# Patient Record
Sex: FEMALE | Race: OTHER | HISPANIC OR LATINO
[De-identification: names, ages, dates, MRNs, and addresses within clinical notes are randomized per-mention and may not be internally consistent; named-entity substitution may affect disease eponyms.]

---

## 2019-02-19 ENCOUNTER — APPOINTMENT (OUTPATIENT)
Dept: NEUROLOGY | Facility: CLINIC | Age: 69
End: 2019-02-19

## 2022-03-08 ENCOUNTER — APPOINTMENT (OUTPATIENT)
Dept: NEUROLOGY | Facility: CLINIC | Age: 72
End: 2022-03-08

## 2023-03-02 ENCOUNTER — APPOINTMENT (OUTPATIENT)
Dept: UROLOGY | Facility: CLINIC | Age: 73
End: 2023-03-02

## 2025-07-22 ENCOUNTER — EMERGENCY (EMERGENCY)
Facility: HOSPITAL | Age: 75
LOS: 0 days | Discharge: HOME | End: 2025-07-22
Attending: EMERGENCY MEDICINE
Payer: MEDICARE

## 2025-07-22 VITALS
HEIGHT: 61 IN | HEART RATE: 97 BPM | RESPIRATION RATE: 18 BRPM | TEMPERATURE: 98 F | WEIGHT: 242.95 LBS | SYSTOLIC BLOOD PRESSURE: 159 MMHG | OXYGEN SATURATION: 97 % | DIASTOLIC BLOOD PRESSURE: 73 MMHG

## 2025-07-22 DIAGNOSIS — E78.00 PURE HYPERCHOLESTEROLEMIA, UNSPECIFIED: ICD-10-CM
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
## 2025-07-22 DIAGNOSIS — G51.0 BELL'S PALSY: ICD-10-CM
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
## 2025-07-22 DIAGNOSIS — S02.5XXA FRACTURE OF TOOTH (TRAUMATIC), INITIAL ENCOUNTER FOR CLOSED FRACTURE: ICD-10-CM

## 2025-07-22 DIAGNOSIS — Z88.2 ALLERGY STATUS TO SULFONAMIDES: ICD-10-CM

## 2025-07-22 DIAGNOSIS — K08.409 PARTIAL LOSS OF TEETH, UNSPECIFIED CAUSE, UNSPECIFIED CLASS: ICD-10-CM

## 2025-07-22 DIAGNOSIS — E11.9 TYPE 2 DIABETES MELLITUS WITHOUT COMPLICATIONS: ICD-10-CM

## 2025-07-22 DIAGNOSIS — S00.12XA CONTUSION OF LEFT EYELID AND PERIOCULAR AREA, INITIAL ENCOUNTER: ICD-10-CM

## 2025-07-22 PROCEDURE — 70486 CT MAXILLOFACIAL W/O DYE: CPT

## 2025-07-22 PROCEDURE — D0140: CPT

## 2025-07-22 PROCEDURE — 99284 EMERGENCY DEPT VISIT MOD MDM: CPT

## 2025-07-22 PROCEDURE — 99284 EMERGENCY DEPT VISIT MOD MDM: CPT | Mod: 25

## 2025-07-22 PROCEDURE — 70450 CT HEAD/BRAIN W/O DYE: CPT

## 2025-07-22 PROCEDURE — 70486 CT MAXILLOFACIAL W/O DYE: CPT | Mod: 26

## 2025-07-22 PROCEDURE — 70450 CT HEAD/BRAIN W/O DYE: CPT | Mod: 26

## 2025-07-22 PROCEDURE — 72125 CT NECK SPINE W/O DYE: CPT

## 2025-07-22 PROCEDURE — 72125 CT NECK SPINE W/O DYE: CPT | Mod: 26

## 2025-07-22 PROCEDURE — D0230: CPT

## 2025-07-22 PROCEDURE — D0330: CPT

## 2025-07-22 RX ORDER — LANOLIN/MINERAL OIL/PETROLATUM
1 OINTMENT (GRAM) OPHTHALMIC (EYE)
Qty: 1 | Refills: 0
Start: 2025-07-22 | End: 2025-07-31

## 2025-07-22 RX ORDER — AMOXICILLIN 500 MG/1
1 CAPSULE ORAL
Qty: 14 | Refills: 0
Start: 2025-07-22 | End: 2025-07-28

## 2025-07-22 NOTE — ED PROVIDER NOTE - PATIENT PORTAL LINK FT
You can access the FollowMyHealth Patient Portal offered by [facility] by registering at the following website: [email or web address]. By joining [facility]’s FollowMyHealth portal, you will also be able to view your health information using other applications (apps) compatible with our system.

## 2025-07-22 NOTE — ED PROVIDER NOTE - PROGRESS NOTE DETAILS
KIM Heredia MD:    Pt. states 10 days ago she noticed the L sided facial droop. She also noticed changes in taste and unable to close L eye completely. CT negative, neuro exam normal except for facial droop. Findings consistent with bells palsy.

## 2025-07-22 NOTE — ED PROVIDER NOTE - NSFOLLOWUPINSTRUCTIONS_ED_ALL_ED_FT
Follow up with your dentist in 1 week    Falls    Your risk of falling increases as you grow older. That's because getting older can make it harder to walk steadily and keep your balance. Also, the effects of falls are more serious in older people. If you have fallen in the past, you are at higher risk of falling again.    Several things can increase your risk of a fall, including:  - Illness(es)  - A change in the medicines you take  - An unsafe or unfamiliar setting (for example, a room with rugs or furniture that might trip you, or an area you don't know well)    Is there anything I can do on my own?    Yes. To help keep from falling, you can:    - Make your home safer:   To avoid falling at home, get rid of things that might make you trip or slip. This might include furniture, electrical cords, clutter, and loose rugs. Keep your home well-lit so that you can easily see where you are going. Avoid storing things in high places so you don't have to reach or climb.    - Wear sturdy shoes that fit well:  Walking around in bare feet, or only socks, can also increase your risk of falling.    - Use a cane, walker, and other safety devices: If your doctor recommends that you use a cane or walker, be sure that it's the right size and you know how to use it. There are other devices that might help you avoid falling, too. These include grab bars or a sturdy seat for the shower, non-slip bath mats, and hand rails or treads for the stairs (to prevent slipping).    If you worry that you could fall, there are also alarm buttons that let you call for help if you fall and can't get up.    What should I do if I fall?  If you fall, see your doctor right away, even if you aren't hurt. Your doctor can try to figure out what caused you to fall, and how likely you are to fall again. He or she will do an exam and talk to you about your health problems, medicines, and activities. Then he or she can suggest things you can do to avoid falling again.    Many older people have a hard time recovering after a fall. Doing things to prevent falling can help you to protect your health and independence.      Lovelace Palsy    WHAT YOU NEED TO KNOW:    What is Bell palsy? Bell palsy is a sudden weakness or paralysis of one side of your face. It occurs when the nerve that controls the muscles in your face becomes swollen or irritated. Bell palsy usually lasts about 2 to 3 weeks, but it can last for up to 6 months. Bell palsy can be permanent for some people. The cause of Bell palsy is not clear.  Lovelace Palsy    What increases my risk for Bell palsy?    Age 15 to 45 years    Pregnancy or preeclampsia (high blood pressure that develops because of pregnancy)    A virus, such as a cold, the flu, herpes simplex, or varicella (chicken pox)    Obesity    High blood pressure    Stress, lack of sleep, injury, or a short illness    A condition such as lupus, Lyme disease, Sjögren syndrome, or diabetes  What are the signs and symptoms of Bell palsy? You may first have pain behind an ear or in your face. Hours or days later, you may have any of the following on the same side of your face:    Weakness or paralysis in your face    Not being able to move your eyebrow or wrinkle your forehead    Trouble closing your eye or blinking, or your eye moves up when you try to close your eyelid    Changes in the amount of tears and saliva you make, such as dry eyes or drooling    Mouth drooping and trouble smiling or chewing    Loss of taste at the front part of your tongue    Sensitive hearing  How is Bell palsy diagnosed? Your healthcare provider will examine you and ask about your medical history. Tell your provider about your symptoms and when they started. Your provider will test how well you can move the muscles in your face. Your provider will need to rule out other causes of paralysis, such as a stroke. Some stroke and Bell palsy symptoms are similar, but only Bell palsy prevents movement of forehead muscles. Bell palsy only affects your face. You may also need any of the following:    An electromyography (EMG) may be used to measure the electrical activity of your muscles. An EMG also tests the nerves that control muscles.    A CT or MRI of your brain may be done to rule out other causes of your paralysis. You may be given contrast liquid to help your brain show up better in the pictures. Tell the healthcare provider if you have ever had an allergic reaction to contrast liquid. Do not enter the MRI room with anything metal. Metal can cause serious injury. Tell the provider if you have any metal in or on your body.  How is Bell palsy treated? Bell palsy often goes away without treatment. Some treatments may help you get better faster or help prevent other problems caused by Bell palsy. You may need any of the following:    Steroids may be given to decrease swelling and irritation of the nerve in your face. Your symptoms can go away faster if you get steroids 72 hours from when your paralysis started.    Antiviral medicine may be given if your provider thinks a virus caused your Bell palsy.    Acetaminophen decreases pain and fever. It is available without a doctor's order. Ask how much to take and how often to take it. Follow directions. Read the labels of all other medicines you are using to see if they also contain acetaminophen, or ask your doctor or pharmacist. Acetaminophen can cause liver damage if not taken correctly.    NSAIDs, such as ibuprofen, help decrease swelling, pain, and fever. This medicine is available with or without a doctor's order. NSAIDs can cause stomach bleeding or kidney problems in certain people. If you take blood thinner medicine, always ask your healthcare provider if NSAIDs are safe for you. Always read the medicine label and follow directions.  What else can I do to help manage Bell palsy?    Eye care may be needed to prevent vision changes, eye damage, and infection. Use eye drops during the day and an ointment at night, as directed. You may need to wear an eye patch during the day. Wear sunglasses to protect your eye from direct sunlight. Stay away from places that have particles in the air that may harm your eye. You may also need to tape your eye shut while you sleep. Get your eye checked as directed if your symptoms last longer than 3 weeks.  Eye Patch      Eat soft foods that are easy to chew and swallow. These foods may be chopped, ground, mashed, pureed, and moist. Do not eat hard or chewy foods. These foods may fall out of your mouth where it droops. Ask your healthcare provider or dietitian about the foods you should eat.    Go to speech therapy if you have trouble eating or drinking that continues longer than 3 weeks. A speech therapist can teach you new ways to eat and drink. The therapist can show you ways to prevent or manage problems with drooling or swallowing. You may also learn to plan several small meals instead of a few large meals each day.    Use ear plugs or ear protectors around loud noises, such as a lawnmower or loud music. Foam earplugs that completely block your ear canal can help decrease your sensitive hearing. Do not listen to loud music through headphones or earphones.    Go to physical therapy as directed. A physical therapist can teach you how to massage and exercise the muscles in your face. These exercises may help prevent long-term problems such as muscle spasms and permanent paralysis in your face.    Talk to a mental health therapist if your symptoms are causing a low mood or anxiety. The therapist can help you cope while you recover.  When should I seek immediate care?    You have vision changes or a loss of vision.    When should I call my doctor?    You have a fever.    Your eye becomes red, irritated, or painful.    Your symptoms have not gone away after 3 weeks.    You have questions or concerns about your condition or care.

## 2025-07-22 NOTE — ED ADULT TRIAGE NOTE - CHIEF COMPLAINT QUOTE
pt fell today, hit her head, has hematoma on right eye. Not on AC, no LOC. ambulatory with cane in triage

## 2025-07-22 NOTE — ED PROVIDER NOTE - OBJECTIVE STATEMENT
75yoF w. PMH of high cholesterol and DM, presents to ED due fall. Pt. sustained a mechanical ground level fall this am, unable to get up for 30min until her son came to help her up. + trauma to head ( R side), no LOC, no blood thinners, no other injuries. No dizziness, chest pan, SOB, abd pain.     Pt. has been seen by dental for a tooth infection on R side.

## 2025-07-22 NOTE — ED PROVIDER NOTE - PHYSICAL EXAMINATION
VITAL SIGNS: I have reviewed nursing notes and confirm.  CONSTITUTIONAL: non toxic   SKIN: echymosis to R orbit  HEAD: NCAT  EYES: EOMI, PERRLA, R eyelid swelling covering the eye. No sings of  nerve entrapment.    ENT: Moist mucous membranes  NECK: Supple; non tender.   CARD: RRR, no murmurs, rubs or gallops  RESP: clear to ausculation b/l.  No rales, rhonchi, or wheezing.  ABD: soft, non-tender, non-distended, no rebound or guarding.   EXT: Full ROM, no bony tenderness  NEURO: Axox3, L sided facial droop. Normal sensation.   PSYCH: Cooperative, appropriate.

## 2025-07-22 NOTE — CONSULT NOTE ADULT - ASSESSMENT
Patient is a 75y old  Female who presents with a chief complaint of in upper left tooth    HPI: Pain to percussion UL tooth, started a few weeks ago with [place] Palsy symptoms      PAST MEDICAL & SURGICAL HISTORY:    (  ) heart valve replacement  (   ) joint replacement  (- ) pregnancy    MEDICATIONS  (STANDING): Atorvastatin, Metformin,     MEDICATIONS  (PRN): n/a      Allergies    sulfa drugs (Anaphylaxis)    Intolerances        FAMILY HISTORY:      *SOCIAL HISTORY: (   ) Tobacco; (   ) ETOH    *Last Dental Visit:    Vital Signs Last 24 Hrs  T(C): 36.7 (22 Jul 2025 11:01), Max: 36.7 (22 Jul 2025 11:01)  T(F): 98.1 (22 Jul 2025 11:01), Max: 98.1 (22 Jul 2025 11:01)  HR: 97 (22 Jul 2025 11:01) (97 - 97)  BP: 159/73 (22 Jul 2025 11:01) (159/73 - 159/73)  BP(mean): --  RR: 18 (22 Jul 2025 11:01) (18 - 18)  SpO2: 97% (22 Jul 2025 11:01) (97% - 97%)    Parameters below as of 22 Jul 2025 11:01  Patient On (Oxygen Delivery Method): room air        LABS:                  EOE:  TMJ ( -  ) clicks                     (  - ) pops                     ( -  ) crepitus             Mandible <<FROM>>             Facial bones and MOM <<grossly intact>>             ( -  ) trismus             ( -  ) lymphadenopathy             (-   ) swelling             (-   ) asymmetry             (  - ) palpation             ( -  ) dyspnea             ( -  ) dysphagia             (  - ) loss of consciousness    IOE:  <<permanent/primary/mixed>> dentition: <<grossly intact>> OR <<multiple carious teeth>> OR <<multiple missing teeth>>           hard/soft palate:  (   ) palatal torus, <<No pathology noted>>           tongue/FOM <<No pathology noted>>           labial/buccal mucosa <<No pathology noted>>           (  + ) percussion- (#14)           (  - ) palpation           ( -  ) swelling            ( -  ) abscess           ( -  ) sinus tract    Dentition present: <<  some missing teeth, most present >>  Mobility: << none >>  Caries: << Tooth #14 is fractured  >>         *DENTAL RADIOGRAPHS: GLOVER, PA    RADIOLOGY & ADDITIONAL STUDIES: none    *ASSESSMENT: Pt shows pain to percussion on #14, CT shows swelling in UL region, pt reports left side facial numbness with loss of taste. No acute/ significant infection present.      *PLAN: Return to ED for Neuro eval.    PROCEDURE: GLOVER , PA radiograph  Verbal and written consent given.  Anesthesia: <<   - >>   Treatment: << -   >>     RECOMMENDATIONS:  1) <<  Neuro eval >>  2) Dental F/U with outpatient dentist for comprehensive dental care.   3) If any difficulty swallowing/breathing, fever occur, return to ER.   Saul Schnitzler [id number]

## 2025-07-22 NOTE — ED PROVIDER NOTE - CLINICAL SUMMARY MEDICAL DECISION MAKING FREE TEXT BOX
VSS.  No distress.  s/p mechanical fall today with head trauma.  No prodrome.  No CP/SOB.  Fully ambulatory in ED.  No anticoagulation.  CT imaging negative for acute pathology, outside of mildly displaced acute fracture in the first left maxillary molar tooth.  Right periorbital soft tissue hematoma and swelling.  Also reports 10 days ago she noticed a L sided facial droop. She also noticed changes in taste and unable to close L eye completely. Unable to wrinkle left side of forehead.  CT negative, neuro exam normal except for facial droop. Findings consistent with [place] Palsy.  DC with Antivirals and Artificial Tears.  Strict return instructions discussed.

## 2025-07-22 NOTE — ED ADULT NURSE NOTE - PRO INTERPRETER NEED 2
English Detail Level: Detailed Quality 226: Preventive Care And Screening: Tobacco Use: Screening And Cessation Intervention: Patient screened for tobacco use and is an ex/non-smoker Quality 431: Preventive Care And Screening: Unhealthy Alcohol Use - Screening: Patient screened for unhealthy alcohol use using a single question and scores less than 2 times per year Quality 130: Documentation Of Current Medications In The Medical Record: Current Medications Documented

## 2025-09-17 ENCOUNTER — EMERGENCY (EMERGENCY)
Facility: HOSPITAL | Age: 75
LOS: 0 days | Discharge: HOME | End: 2025-09-17
Attending: EMERGENCY MEDICINE
Payer: MEDICARE

## 2025-09-17 VITALS
OXYGEN SATURATION: 100 % | DIASTOLIC BLOOD PRESSURE: 73 MMHG | TEMPERATURE: 98 F | HEIGHT: 61 IN | WEIGHT: 237 LBS | RESPIRATION RATE: 18 BRPM | HEART RATE: 92 BPM | SYSTOLIC BLOOD PRESSURE: 145 MMHG

## 2025-09-17 VITALS
HEART RATE: 73 BPM | TEMPERATURE: 98 F | DIASTOLIC BLOOD PRESSURE: 66 MMHG | RESPIRATION RATE: 18 BRPM | SYSTOLIC BLOOD PRESSURE: 130 MMHG | OXYGEN SATURATION: 97 %

## 2025-09-17 DIAGNOSIS — M54.50 LOW BACK PAIN, UNSPECIFIED: ICD-10-CM

## 2025-09-17 DIAGNOSIS — Z88.2 ALLERGY STATUS TO SULFONAMIDES: ICD-10-CM

## 2025-09-17 LAB
APPEARANCE UR: CLEAR — SIGNIFICANT CHANGE UP
BACTERIA # UR AUTO: ABNORMAL /HPF
BILIRUB UR-MCNC: NEGATIVE — SIGNIFICANT CHANGE UP
CAST: 2 /LPF — SIGNIFICANT CHANGE UP (ref 0–4)
COLOR SPEC: YELLOW — SIGNIFICANT CHANGE UP
DIFF PNL FLD: NEGATIVE — SIGNIFICANT CHANGE UP
GLUCOSE UR QL: NEGATIVE MG/DL — SIGNIFICANT CHANGE UP
KETONES UR QL: NEGATIVE MG/DL — SIGNIFICANT CHANGE UP
LEUKOCYTE ESTERASE UR-ACNC: ABNORMAL
NITRITE UR-MCNC: NEGATIVE — SIGNIFICANT CHANGE UP
PH UR: 6 — SIGNIFICANT CHANGE UP (ref 5–8)
PROT UR-MCNC: NEGATIVE MG/DL — SIGNIFICANT CHANGE UP
RBC CASTS # UR COMP ASSIST: 2 /HPF — SIGNIFICANT CHANGE UP (ref 0–4)
SP GR SPEC: 1.02 — SIGNIFICANT CHANGE UP (ref 1–1.03)
SQUAMOUS # UR AUTO: 9 /HPF — HIGH (ref 0–5)
UROBILINOGEN FLD QL: 0.2 MG/DL — SIGNIFICANT CHANGE UP (ref 0.2–1)
WBC UR QL: 9 /HPF — HIGH (ref 0–5)

## 2025-09-17 PROCEDURE — 99284 EMERGENCY DEPT VISIT MOD MDM: CPT

## 2025-09-17 PROCEDURE — 81001 URINALYSIS AUTO W/SCOPE: CPT

## 2025-09-17 PROCEDURE — 87086 URINE CULTURE/COLONY COUNT: CPT

## 2025-09-17 PROCEDURE — 96372 THER/PROPH/DIAG INJ SC/IM: CPT

## 2025-09-17 PROCEDURE — 99283 EMERGENCY DEPT VISIT LOW MDM: CPT | Mod: 25

## 2025-09-17 RX ORDER — LIDOCAINE HYDROCHLORIDE 20 MG/ML
1 JELLY TOPICAL
Qty: 1 | Refills: 0
Start: 2025-09-17 | End: 2025-09-21

## 2025-09-17 RX ORDER — METHOCARBAMOL 500 MG/1
1 TABLET, FILM COATED ORAL
Qty: 21 | Refills: 0
Start: 2025-09-17 | End: 2025-09-23

## 2025-09-17 RX ORDER — METHOCARBAMOL 500 MG/1
1000 TABLET, FILM COATED ORAL ONCE
Refills: 0 | Status: COMPLETED | OUTPATIENT
Start: 2025-09-17 | End: 2025-09-17

## 2025-09-17 RX ORDER — ACETAMINOPHEN 500 MG/5ML
975 LIQUID (ML) ORAL ONCE
Refills: 0 | Status: COMPLETED | OUTPATIENT
Start: 2025-09-17 | End: 2025-09-17

## 2025-09-17 RX ORDER — LIDOCAINE HYDROCHLORIDE 20 MG/ML
1 JELLY TOPICAL ONCE
Refills: 0 | Status: COMPLETED | OUTPATIENT
Start: 2025-09-17 | End: 2025-09-17

## 2025-09-17 RX ORDER — KETOROLAC TROMETHAMINE 30 MG/ML
30 INJECTION, SOLUTION INTRAMUSCULAR; INTRAVENOUS ONCE
Refills: 0 | Status: DISCONTINUED | OUTPATIENT
Start: 2025-09-17 | End: 2025-09-17

## 2025-09-17 RX ADMIN — Medication 975 MILLIGRAM(S): at 12:57

## 2025-09-17 RX ADMIN — KETOROLAC TROMETHAMINE 30 MILLIGRAM(S): 30 INJECTION, SOLUTION INTRAMUSCULAR; INTRAVENOUS at 14:55

## 2025-09-17 RX ADMIN — LIDOCAINE HYDROCHLORIDE 1 PATCH: 20 JELLY TOPICAL at 13:55

## 2025-09-17 RX ADMIN — METHOCARBAMOL 1000 MILLIGRAM(S): 500 TABLET, FILM COATED ORAL at 13:20

## 2025-09-18 PROBLEM — E78.00 PURE HYPERCHOLESTEROLEMIA, UNSPECIFIED: Chronic | Status: ACTIVE | Noted: 2025-09-17

## 2025-09-18 PROBLEM — I10 ESSENTIAL (PRIMARY) HYPERTENSION: Chronic | Status: ACTIVE | Noted: 2025-09-17

## 2025-09-18 PROBLEM — E11.9 TYPE 2 DIABETES MELLITUS WITHOUT COMPLICATIONS: Chronic | Status: ACTIVE | Noted: 2025-09-17

## 2025-09-18 LAB
CULTURE RESULTS: SIGNIFICANT CHANGE UP
SPECIMEN SOURCE: SIGNIFICANT CHANGE UP